# Patient Record
Sex: FEMALE | Race: WHITE | NOT HISPANIC OR LATINO | Employment: FULL TIME | URBAN - METROPOLITAN AREA
[De-identification: names, ages, dates, MRNs, and addresses within clinical notes are randomized per-mention and may not be internally consistent; named-entity substitution may affect disease eponyms.]

---

## 2021-09-09 ENCOUNTER — TELEPHONE (OUTPATIENT)
Dept: OBGYN CLINIC | Facility: CLINIC | Age: 66
End: 2021-09-09

## 2021-09-09 DIAGNOSIS — N39.3 SUI (STRESS URINARY INCONTINENCE, FEMALE): Primary | ICD-10-CM

## 2021-09-09 NOTE — TELEPHONE ENCOUNTER
Patient called stating that she was supposed to schedule urodynamic test due to leakage but had to cancel the appointment last year  She have an appointment for consult on 10/23/21 but unsure if she still need the consult again for the urodynamic study or she can just schedule the study  She wishes if possible to move up her appointment as she meet he deductible this year and wishes to proceed with the surgery if indicated on time before the end of the year  Advised will forward her message to Dr Yves Fernandes  Pt voiced appreciation  Dr Yves Fernandes please address

## 2021-09-10 PROBLEM — N39.3 SUI (STRESS URINARY INCONTINENCE, FEMALE): Status: ACTIVE | Noted: 2021-09-10

## 2021-09-10 NOTE — TELEPHONE ENCOUNTER
If we are not doing them, then they are generally done through a urogynecologist   Dr Reina Waterman does urogyn through [address], I believe he has a [facility] or [place] office  We can provide a referral if she would like

## 2021-09-10 NOTE — TELEPHONE ENCOUNTER
If she still wants to do the urodynamics, she can just schedule it, she'll then need a consult with Dr Ariane Ny afterwards to review results (he performs the surgery/procedure for treatment)  She does not need to see me for a consult before the urodynamics unless she wishes to review her symptoms again or feels that they are significantly different than they were last year

## 2021-09-10 NOTE — TELEPHONE ENCOUNTER
Called and spoke with patient  She agreed to follow-up with Dr Ezra Tracey urogyn specialist  Provided patient with Dr Mick Huerta please provide referral for patient  Thanks   Pt also would like to cancel her consult appointment on 10/27/21; appointment cancelled per patient request

## 2021-09-10 NOTE — TELEPHONE ENCOUNTER
Referral placed  Copy should print at [facility] office if patient would like copy

## 2021-10-27 ENCOUNTER — ANESTHESIA EVENT (OUTPATIENT)
Dept: PERIOP | Facility: HOSPITAL | Age: 66
End: 2021-10-27
Payer: COMMERCIAL

## 2021-10-27 RX ORDER — VERAPAMIL HYDROCHLORIDE 120 MG/1
120 TABLET, FILM COATED ORAL
COMMUNITY

## 2021-10-27 RX ORDER — ASPIRIN 325 MG
325 TABLET ORAL DAILY
COMMUNITY

## 2021-10-27 RX ORDER — VERAPAMIL HYDROCHLORIDE 240 MG/1
240 TABLET, FILM COATED, EXTENDED RELEASE ORAL DAILY
COMMUNITY

## 2021-10-27 RX ORDER — ESOMEPRAZOLE MAGNESIUM 40 MG/1
40 CAPSULE, DELAYED RELEASE ORAL
COMMUNITY

## 2021-10-27 RX ORDER — OMEGA-3S/DHA/EPA/FISH OIL/D3 300MG-1000
400 CAPSULE ORAL DAILY
COMMUNITY

## 2021-10-27 RX ORDER — DIPHENOXYLATE HYDROCHLORIDE AND ATROPINE SULFATE 2.5; .025 MG/1; MG/1
1 TABLET ORAL DAILY
COMMUNITY

## 2021-10-27 RX ORDER — AMPICILLIN TRIHYDRATE 250 MG
1 CAPSULE ORAL DAILY
COMMUNITY

## 2021-11-01 ENCOUNTER — ANESTHESIA (OUTPATIENT)
Dept: PERIOP | Facility: HOSPITAL | Age: 66
End: 2021-11-01
Payer: COMMERCIAL

## 2021-11-01 ENCOUNTER — HOSPITAL ENCOUNTER (OUTPATIENT)
Facility: HOSPITAL | Age: 66
Setting detail: OUTPATIENT SURGERY
Discharge: HOME | End: 2021-11-01
Attending: OBSTETRICS & GYNECOLOGY | Admitting: OBSTETRICS & GYNECOLOGY
Payer: COMMERCIAL

## 2021-11-01 VITALS
SYSTOLIC BLOOD PRESSURE: 121 MMHG | RESPIRATION RATE: 20 BRPM | TEMPERATURE: 98.1 F | DIASTOLIC BLOOD PRESSURE: 88 MMHG | HEART RATE: 67 BPM | HEIGHT: 66 IN | BODY MASS INDEX: 26.52 KG/M2 | WEIGHT: 165 LBS | OXYGEN SATURATION: 96 %

## 2021-11-01 DIAGNOSIS — N81.6 RECTOCELE: Primary | ICD-10-CM

## 2021-11-01 DIAGNOSIS — N39.3 STRESS INCONTINENCE (FEMALE) (MALE): ICD-10-CM

## 2021-11-01 PROBLEM — N81.83 INCOMPETENCE OR WEAKENING OF RECTOVAGINAL TISSUE: Status: ACTIVE | Noted: 2021-11-01

## 2021-11-01 PROCEDURE — 51798 US URINE CAPACITY MEASURE: CPT | Performed by: OBSTETRICS & GYNECOLOGY

## 2021-11-01 PROCEDURE — C1771 REP DEV, URINARY, W/SLING: HCPCS | Performed by: OBSTETRICS & GYNECOLOGY

## 2021-11-01 DEVICE — SINGLE INCISION SLING SYSTEM
Type: IMPLANTABLE DEVICE | Site: BLADDER | Status: FUNCTIONAL
Brand: ALTIS

## 2021-11-01 RX ORDER — IBUPROFEN 600 MG/1
600 TABLET ORAL EVERY 6 HOURS PRN
Qty: 30 TABLET | Refills: 0
Start: 2021-11-01 | End: 2022-01-04

## 2021-11-01 RX ORDER — FENTANYL CITRATE/PF 50 MCG/ML
25 SYRINGE (ML) INJECTION
Status: DISCONTINUED | OUTPATIENT
Start: 2021-11-01 | End: 2021-11-01 | Stop reason: HOSPADM

## 2021-11-01 RX ORDER — DEXAMETHASONE SODIUM PHOSPHATE 4 MG/ML
INJECTION, SOLUTION INTRA-ARTICULAR; INTRALESIONAL; INTRAMUSCULAR; INTRAVENOUS; SOFT TISSUE AS NEEDED
Status: DISCONTINUED | OUTPATIENT
Start: 2021-11-01 | End: 2021-11-01

## 2021-11-01 RX ORDER — DOCUSATE SODIUM 100 MG/1
100 CAPSULE, LIQUID FILLED ORAL 2 TIMES DAILY PRN
Refills: 0
Start: 2021-11-01 | End: 2022-01-04

## 2021-11-01 RX ORDER — PROPOFOL 10 MG/ML
INJECTION, EMULSION INTRAVENOUS CONTINUOUS PRN
Status: DISCONTINUED | OUTPATIENT
Start: 2021-11-01 | End: 2021-11-01

## 2021-11-01 RX ORDER — ONDANSETRON 2 MG/ML
4 INJECTION INTRAMUSCULAR; INTRAVENOUS EVERY 6 HOURS PRN
Status: DISCONTINUED | OUTPATIENT
Start: 2021-11-01 | End: 2021-11-01 | Stop reason: HOSPADM

## 2021-11-01 RX ORDER — ONDANSETRON 2 MG/ML
4 INJECTION INTRAMUSCULAR; INTRAVENOUS ONCE AS NEEDED
Status: DISCONTINUED | OUTPATIENT
Start: 2021-11-01 | End: 2021-11-01 | Stop reason: HOSPADM

## 2021-11-01 RX ORDER — CEFAZOLIN SODIUM 2 G/50ML
2000 SOLUTION INTRAVENOUS ONCE
Status: COMPLETED | OUTPATIENT
Start: 2021-11-01 | End: 2021-11-01

## 2021-11-01 RX ORDER — FENTANYL CITRATE 50 UG/ML
INJECTION, SOLUTION INTRAMUSCULAR; INTRAVENOUS AS NEEDED
Status: DISCONTINUED | OUTPATIENT
Start: 2021-11-01 | End: 2021-11-01

## 2021-11-01 RX ORDER — SODIUM CHLORIDE 9 MG/ML
125 INJECTION, SOLUTION INTRAVENOUS CONTINUOUS
Status: DISCONTINUED | OUTPATIENT
Start: 2021-11-01 | End: 2021-11-01 | Stop reason: HOSPADM

## 2021-11-01 RX ORDER — ACETAMINOPHEN 325 MG/1
650 TABLET ORAL EVERY 6 HOURS PRN
Status: DISCONTINUED | OUTPATIENT
Start: 2021-11-01 | End: 2021-11-01 | Stop reason: HOSPADM

## 2021-11-01 RX ORDER — LIDOCAINE HYDROCHLORIDE 20 MG/ML
INJECTION, SOLUTION EPIDURAL; INFILTRATION; INTRACAUDAL; PERINEURAL AS NEEDED
Status: DISCONTINUED | OUTPATIENT
Start: 2021-11-01 | End: 2021-11-01

## 2021-11-01 RX ORDER — MIDAZOLAM HYDROCHLORIDE 2 MG/2ML
INJECTION, SOLUTION INTRAMUSCULAR; INTRAVENOUS AS NEEDED
Status: DISCONTINUED | OUTPATIENT
Start: 2021-11-01 | End: 2021-11-01

## 2021-11-01 RX ORDER — ONDANSETRON 2 MG/ML
INJECTION INTRAMUSCULAR; INTRAVENOUS AS NEEDED
Status: DISCONTINUED | OUTPATIENT
Start: 2021-11-01 | End: 2021-11-01

## 2021-11-01 RX ORDER — SENNOSIDES 8.6 MG
650 CAPSULE ORAL EVERY 8 HOURS PRN
Qty: 30 TABLET | Refills: 0
Start: 2021-11-01 | End: 2022-01-04

## 2021-11-01 RX ADMIN — MIDAZOLAM 2 MG: 1 INJECTION INTRAMUSCULAR; INTRAVENOUS at 08:56

## 2021-11-01 RX ADMIN — LIDOCAINE HYDROCHLORIDE 100 MG: 20 INJECTION, SOLUTION EPIDURAL; INFILTRATION; INTRACAUDAL; PERINEURAL at 09:03

## 2021-11-01 RX ADMIN — ONDANSETRON 4 MG: 2 INJECTION INTRAMUSCULAR; INTRAVENOUS at 09:15

## 2021-11-01 RX ADMIN — SODIUM CHLORIDE: 0.9 INJECTION, SOLUTION INTRAVENOUS at 09:40

## 2021-11-01 RX ADMIN — SODIUM CHLORIDE 125 ML/HR: 0.9 INJECTION, SOLUTION INTRAVENOUS at 08:10

## 2021-11-01 RX ADMIN — ACETAMINOPHEN 650 MG: 325 TABLET, FILM COATED ORAL at 11:15

## 2021-11-01 RX ADMIN — DEXAMETHASONE SODIUM PHOSPHATE 4 MG: 4 INJECTION INTRA-ARTICULAR; INTRALESIONAL; INTRAMUSCULAR; INTRAVENOUS; SOFT TISSUE at 09:15

## 2021-11-01 RX ADMIN — PROPOFOL 120 MCG/KG/MIN: 10 INJECTION, EMULSION INTRAVENOUS at 09:03

## 2021-11-01 RX ADMIN — FENTANYL CITRATE 25 MCG: 50 INJECTION INTRAMUSCULAR; INTRAVENOUS at 10:05

## 2021-11-01 RX ADMIN — CEFAZOLIN SODIUM 2000 MG: 2 SOLUTION INTRAVENOUS at 08:56

## 2021-11-01 RX ADMIN — FENTANYL CITRATE 50 MCG: 50 INJECTION INTRAMUSCULAR; INTRAVENOUS at 09:03

## 2021-12-22 ENCOUNTER — VBI (OUTPATIENT)
Dept: ADMINISTRATIVE | Facility: OTHER | Age: 66
End: 2021-12-22

## 2022-01-04 PROBLEM — Z79.890 HORMONE REPLACEMENT THERAPY (HRT): Status: ACTIVE | Noted: 2022-01-04

## 2022-01-04 PROBLEM — N60.92 ATYPICAL DUCTAL HYPERPLASIA OF LEFT BREAST: Status: ACTIVE | Noted: 2022-01-04

## 2022-01-04 PROBLEM — M85.851 OSTEOPENIA OF NECK OF RIGHT FEMUR: Status: ACTIVE | Noted: 2022-01-04

## 2022-01-05 ENCOUNTER — ANNUAL EXAM (OUTPATIENT)
Dept: OBGYN CLINIC | Facility: CLINIC | Age: 67
End: 2022-01-05
Payer: COMMERCIAL

## 2022-01-05 VITALS
HEIGHT: 66 IN | BODY MASS INDEX: 27 KG/M2 | DIASTOLIC BLOOD PRESSURE: 72 MMHG | WEIGHT: 168 LBS | SYSTOLIC BLOOD PRESSURE: 112 MMHG

## 2022-01-05 DIAGNOSIS — Z01.419 ENCOUNTER FOR ANNUAL ROUTINE GYNECOLOGICAL EXAMINATION: Primary | ICD-10-CM

## 2022-01-05 DIAGNOSIS — Z79.890 HORMONE REPLACEMENT THERAPY (HRT): ICD-10-CM

## 2022-01-05 DIAGNOSIS — N60.92 ATYPICAL DUCTAL HYPERPLASIA OF LEFT BREAST: ICD-10-CM

## 2022-01-05 DIAGNOSIS — Z12.31 ENCOUNTER FOR SCREENING MAMMOGRAM FOR MALIGNANT NEOPLASM OF BREAST: ICD-10-CM

## 2022-01-05 DIAGNOSIS — N95.2 VAGINAL ATROPHY: ICD-10-CM

## 2022-01-05 DIAGNOSIS — M85.851 OSTEOPENIA OF NECK OF RIGHT FEMUR: ICD-10-CM

## 2022-01-05 PROCEDURE — 99397 PER PM REEVAL EST PAT 65+ YR: CPT | Performed by: OBSTETRICS & GYNECOLOGY

## 2022-01-05 RX ORDER — ESTRADIOL 0.1 MG/G
CREAM VAGINAL
Qty: 42.5 G | Refills: 4 | Status: SHIPPED | OUTPATIENT
Start: 2022-01-05

## 2022-01-05 NOTE — LETTER
2022     Katie Elmore DO  [address] [place] [address]    Patient: Ishaan Zuniga   YOB: 1955   Date of Visit: 2022       Dear Dr Myriam Munoz: Thank you for referring Derek Rea to me for evaluation  Below are my notes for this consultation  If you have questions, please do not hesitate to call me  I look forward to following your patient along with you  Sincerely,        Josh Galvan MD        CC: No Recipients  Josh Galvan MD  2022 12:07 PM  Sign when Signing Visit  [address]  [facility] [address], [facility], [address]    ASSESSMENT/PLAN: Ishaan Zuniga is a 74 y o   who presents for gynecologic wellness exam     Encounter for routine gynecologic examination  - Routine well woman exam completed today  - Cervical Cancer Screening complete, no further screening necessary   - Breast Cancer Screening: Last Mammogram 2021 normal - following w/ [facility] due to [facility]  - Colorectal cancer screening last  per pt, next due    - Osteoporosis screening 2019 - osteopenia  - The following were reviewed in today's visit: breast self exam, mammography screening ordered, use and side effects of HRT, menopause, adequate intake of calcium and vitamin D, exercise and healthy diet  Additional problems addressed during this visit:  1  Encounter for annual routine gynecological examination    2  Encounter for screening mammogram for malignant neoplasm of breast  -     Mammo screening bilateral w 3d & cad; Future    3  Atypical ductal hyperplasia of left breast  Assessment & Plan:  2020 breast bx showed atypical ducal hyperplasia, neg margins  Follows with Dr  [facility])  Last mammo 2021 2c w/ bilateral u/s; MRI 2020  Patient today reports she is still taking HRT PO Estrace  I believe this is contraindicated with ADH    I sent [place] Text to Dr [facility], who responded and confirmed yes systemic HRT is contraindicated, as ADH is risk factor for breast cancer and patients are offered anti-estrogen treatment for prevention  4  Hormone replacement therapy (HRT)  Assessment & Plan:  Patient has been on Estrace since  DARLINE, BSO for endometriosis; she reports symptoms significant for hot flashes  Review of chart and [facility] records show 2020 breast biopsy showed atypical ductal hyperplasia diagnosis of breast and patient is being followed at high risk breast clinic with Dr Genaro Lewis, [facility]  I checked with Dr Genaro Lewis who confirmed systemic HRT is contraindicated, patients with ADH are offered estrogen blockers to decrease risk of future breast cancer  I discussed this with Jose chao, who had previously reported breast biopsy showed benign papilloma  I reviewed with her the records from [facility] system and ADH diagnosis  She states she did not remember this diagnosis, but when I mentioned sometimes these patients are offered anti-estrogen therapy, she said that did seem familiar  I told her I would not refill her PO estrace  I discussed we could switch to vaginal estrogen, which is not systemic, especially given her recent rectocele repair and sling for OMAIRA  I discussed this helps with vaginal and bladder health  She agreed to prescription  I encouraged her to discuss with Dr Osiel Lopez at her post op visit, I'm sure he prescribes it often  5  Vaginal atrophy  -     estradiol (ESTRACE) 0 1 mg/g vaginal cream; 1 gram vaginally 2x each week at night, may decrease to weekly if still effective at lower dose  6  Osteopenia of neck of right femur  Assessment & Plan:  Cont Ca, Vit D, Exercise  Next visit: 1 year Wellness      CC:  Gynecologic Wellness Examination    HPI: Sheri Noonan is a 74 y o   who presents for gynecologic examination  Patient reports she had rectocele repair and sling placed for OMAIRA 8 weeks ago with Dr Osiel Lopez (urogyn)    She states she is doing well, she has f/u with him tomorrow  She requests no internal vaginal exam today  She report urinary incontinence improved since surgery  She denies breast issues - confirms following with high risk clinic at [facility] with Dr Mitzy Hurtado  Gyn History       She  reports being sexually active and has had partner(s) who are male  She reports using the following methods of birth control/protection: Post-menopausal and Female Sterilization  Past Medical History:  07/2020: Atypical ductal hyperplasia of breast      Comment:  - following with Dr Mitzy Hurtado, [facility] high risk breast clinic  No date: Gastroesophageal reflux disease  No date: GERD (gastroesophageal reflux disease)  12/1981: History of endometriosis  No date: Lipoma of anterior chest wall  No date: Migraines  No date: PONV (postoperative nausea and vomiting)     Past Surgical History:  2016: ANKLE SURGERY;  Left  03/2020: BREAST BIOPSY  07/03/2020: BREAST LUMPECTOMY      Comment:  atypical ductal hyperplasia  01/2020: BREAST SURGERY      Comment:  multiple papilomas  2019: DXA PROCEDURE (HISTORICAL)  2020: PANCREATIC CYST BIOPSY  11/01/2021: UT CYSTOURETHROSCOPY; N/A      Comment:  Procedure: CYSTOSCOPY;  Surgeon: Catina Swann MD;                 [facility];  Service: UroGynecology         11/01/2021: Kortney Radford; N/A      Comment:  Procedure: Azar Backbone;  Surgeon: Catina Swann MD;  [facility];  Service: UroGynecology         11/01/2021: UT SLING OPER STRES INCONTINENCE; N/A      Comment:  Procedure: PUBOVAGINAL SLING;  Surgeon: Catina Swann MD;  [facility];  Service: UroGynecology         1989: TOTAL ABDOMINAL HYSTERECTOMY W/ BILATERAL SALPINGOOPHORECTOMY      Comment:  for endometriosis     Family History   Problem Relation Age of Onset    Cancer Father     Colon cancer Father     Hypertension Father     Hypertension Mother         Social History     Tobacco Use    Smoking status: Never Smoker    Smokeless tobacco: Never Used   Vaping Use    Vaping Use: Never used   Substance Use Topics    Alcohol use: Never    Drug use: Never          Current Outpatient Medications:     aspirin 325 mg tablet, Take 325 mg by mouth daily, Disp: , Rfl:     CALCIUM CITRATE PO, Take 630 mg by mouth daily, Disp: , Rfl:     cholecalciferol (VITAMIN D3) 400 units tablet, Take 400 Units by mouth daily, Disp: , Rfl:     esomeprazole (NexIUM) 40 MG capsule, Take 40 mg by mouth every morning before breakfast, Disp: , Rfl:     Ferrous Gluconate (IRON 27 PO), Take by mouth 3x/week, Disp: , Rfl:     magnesium oxide (MAG-OX) 400 mg, Take 400 mg by mouth daily, Disp: , Rfl:     multivitamin (THERAGRAN) TABS, Take 1 tablet by mouth daily, Disp: , Rfl:     Red Yeast Rice 600 MG CAPS, Take 1 capsule by mouth daily, Disp: , Rfl:     verapamil (CALAN) 120 mg tablet, Take 120 mg by mouth daily at bedtime, Disp: , Rfl:     verapamil (CALAN-SR) 240 mg CR tablet, Take 240 mg by mouth daily In morning, Disp: , Rfl:     NON FORMULARY, 1 caplet daily Fever few, Disp: , Rfl:     She is allergic to compazine [prochlorperazine] and other       ROS negative except as noted in HPI    Objective:  /72 (BP Location: Left arm, Patient Position: Sitting, Cuff Size: Standard)   Ht 5' 6" (1 676 m)   Wt 76 2 kg (168 lb)   BMI 27 12 kg/m²      Physical Exam  Constitutional:       Appearance: Normal appearance  Chest:   Breasts:      Right: Normal  No mass, tenderness or axillary adenopathy  Left: Normal  No mass, tenderness or axillary adenopathy  Abdominal:      Palpations: Abdomen is soft  Tenderness: There is no abdominal tenderness  Genitourinary:     General: Normal vulva  Comments: Atrophic changes appropriate to age; perineum healing well from rectocele repair  Pt declined internal exam, reports is seeing Dr Jessica Rahman for f/u in next few weeks    Musculoskeletal:         General: Normal range of motion  Lymphadenopathy:      Upper Body:      Right upper body: No axillary adenopathy  Left upper body: No axillary adenopathy  Neurological:      Mental Status: She is alert and oriented to person, place, and time     Psychiatric:         Mood and Affect: Mood normal          Behavior: Behavior normal

## 2022-01-05 NOTE — PROGRESS NOTES
[address] [facility] [address], [facility], [address]    ASSESSMENT/PLAN: Tia Montes De Oca is a 74 y o   who presents for gynecologic wellness exam     Encounter for routine gynecologic examination  - Routine well woman exam completed today  - Cervical Cancer Screening complete, no further screening necessary   - Breast Cancer Screening: Last Mammogram 2021 normal - following w/ Dr Tiffanie Trinh breast surgeon due to [facility]  - Colorectal cancer screening last  per pt, next due    - Osteoporosis screening 2019 - osteopenia  - The following were reviewed in today's visit: breast self exam, mammography screening ordered, use and side effects of HRT, menopause, adequate intake of calcium and vitamin D, exercise and healthy diet  Additional problems addressed during this visit:  1  Encounter for annual routine gynecological examination    2  Encounter for screening mammogram for malignant neoplasm of breast  -     Mammo screening bilateral w 3d & cad; Future    3  Atypical ductal hyperplasia of left breast  Assessment & Plan:  2020 breast bx showed atypical ducal hyperplasia, neg margins  Follows with Dr Tiffanie Trinh [facility] Breast Surgeon)  Last mammo 2021 2c w/ bilateral u/s; MRI 2020  Patient today reports she is still taking HRT PO Estrace  I believe this is contraindicated with ADH  I sent [place] Text to Dr Tiffanie Trinh, who responded and confirmed yes systemic HRT is contraindicated, as ADH is risk factor for breast cancer and patients are offered anti-estrogen treatment for prevention  4  Hormone replacement therapy (HRT)  Assessment & Plan:  Patient has been on Estrace since  DARLINE, BSO for endometriosis; she reports symptoms significant for hot flashes  Review of chart and [facility] records show 2020 breast biopsy showed atypical ductal hyperplasia diagnosis of breast and patient is being followed at high risk breast clinic with Dr Tiffanie Trinh, [facility]    I checked with Dr Jolene Gtz who confirmed systemic HRT is contraindicated, patients with ADH are offered estrogen blockers to decrease risk of future breast cancer  I discussed this with Yaneli Arcos today, who had previously reported breast biopsy showed benign papilloma  I reviewed with her the records from [facility] system and ADH diagnosis  She states she did not remember this diagnosis, but when I mentioned sometimes these patients are offered anti-estrogen therapy, she said that did seem familiar  I told her I would not refill her PO estrace  I discussed we could switch to vaginal estrogen, which is not systemic, especially given her recent rectocele repair and sling for OMAIRA  I discussed this helps with vaginal and bladder health  She agreed to prescription  I encouraged her to discuss with Dr Grace Hoffman at her post op visit, I'm sure he prescribes it often  5  Vaginal atrophy  -     estradiol (ESTRACE) 0 1 mg/g vaginal cream; 1 gram vaginally 2x each week at night, may decrease to weekly if still effective at lower dose  6  Osteopenia of neck of right femur  Assessment & Plan:  Cont Ca, Vit D, Exercise  Next visit: 1 year Wellness      CC:  Gynecologic Wellness Examination    HPI: Jamal Chino is a 74 y o   who presents for gynecologic examination  Patient reports she had rectocele repair and sling placed for OMAIRA 8 weeks ago with Dr Grace Hoffman (urogyn)  She states she is doing well, she has f/u with him tomorrow  She requests no internal vaginal exam today  She report urinary incontinence improved since surgery  She denies breast issues - confirms following with high risk clinic at [facility] with Dr Jolene Gtz  Gyn History       She  reports being sexually active and has had partner(s) who are male  She reports using the following methods of birth control/protection: Post-menopausal and Female Sterilization  Past Medical History:  2020:  Atypical ductal hyperplasia of breast      Comment:  - following with Dr Carmen Flores, [facility] high risk breast clinic  No date: Gastroesophageal reflux disease  No date: GERD (gastroesophageal reflux disease)  12/1981: History of endometriosis  No date: Lipoma of anterior chest wall  No date: Migraines  No date: PONV (postoperative nausea and vomiting)     Past Surgical History:  2016: ANKLE SURGERY;  Left  03/2020: BREAST BIOPSY  07/03/2020: BREAST LUMPECTOMY      Comment:  atypical ductal hyperplasia  01/2020: BREAST SURGERY      Comment:  multiple papilomas  2019: DXA PROCEDURE (HISTORICAL)  2020: PANCREATIC CYST BIOPSY  11/01/2021: CA CYSTOURETHROSCOPY; N/A      Comment:  Procedure: CYSTOSCOPY;  Surgeon: Slade Rosa MD;                 [facility];  Service: UroGynecology         11/01/2021: Amalia Jimenez; N/A      Comment:  Procedure: Fortuna Ligas;  Surgeon: Slade Rosa MD;  [facility];  Service: UroGynecology         11/01/2021: CA SLING OPER STRES INCONTINENCE; N/A      Comment:  Procedure: PUBOVAGINAL SLING;  Surgeon: Slade Rosa MD;  [facility];  Service: UroGynecology         1989: TOTAL ABDOMINAL HYSTERECTOMY W/ BILATERAL SALPINGOOPHORECTOMY      Comment:  for endometriosis     Family History   Problem Relation Age of Onset    Cancer Father     Colon cancer Father     Hypertension Father     Hypertension Mother         Social History     Tobacco Use    Smoking status: Never Smoker    Smokeless tobacco: Never Used   Vaping Use    Vaping Use: Never used   Substance Use Topics    Alcohol use: Never    Drug use: Never          Current Outpatient Medications:     aspirin 325 mg tablet, Take 325 mg by mouth daily, Disp: , Rfl:     CALCIUM CITRATE PO, Take 630 mg by mouth daily, Disp: , Rfl:     cholecalciferol (VITAMIN D3) 400 units tablet, Take 400 Units by mouth daily, Disp: , Rfl:     esomeprazole (NexIUM) 40 MG capsule, Take 40 mg by mouth every morning before breakfast, Disp: , Rfl:     Ferrous Gluconate (IRON 27 PO), Take by mouth 3x/week, Disp: , Rfl:     magnesium oxide (MAG-OX) 400 mg, Take 400 mg by mouth daily, Disp: , Rfl:     multivitamin (THERAGRAN) TABS, Take 1 tablet by mouth daily, Disp: , Rfl:     Red Yeast Rice 600 MG CAPS, Take 1 capsule by mouth daily, Disp: , Rfl:     verapamil (CALAN) 120 mg tablet, Take 120 mg by mouth daily at bedtime, Disp: , Rfl:     verapamil (CALAN-SR) 240 mg CR tablet, Take 240 mg by mouth daily In morning, Disp: , Rfl:     NON FORMULARY, 1 caplet daily Fever few, Disp: , Rfl:     She is allergic to compazine [prochlorperazine] and other       ROS negative except as noted in HPI    Objective:  /72 (BP Location: Left arm, Patient Position: Sitting, Cuff Size: Standard)   Ht 5' 6" (1 676 m)   Wt 76 2 kg (168 lb)   BMI 27 12 kg/m²      Physical Exam  Constitutional:       Appearance: Normal appearance  Chest:   Breasts:      Right: Normal  No mass, tenderness or axillary adenopathy  Left: Normal  No mass, tenderness or axillary adenopathy  Abdominal:      Palpations: Abdomen is soft  Tenderness: There is no abdominal tenderness  Genitourinary:     General: Normal vulva  Comments: Atrophic changes appropriate to age; perineum healing well from rectocele repair  Pt declined internal exam, reports is seeing Dr Ann-Marie Jhaveri for f/u in next few weeks  Musculoskeletal:         General: Normal range of motion  Lymphadenopathy:      Upper Body:      Right upper body: No axillary adenopathy  Left upper body: No axillary adenopathy  Neurological:      Mental Status: She is alert and oriented to person, place, and time     Psychiatric:         Mood and Affect: Mood normal          Behavior: Behavior normal

## 2022-01-05 NOTE — ASSESSMENT & PLAN NOTE
Pt currently on HRT to menopausal sx  Patient reports improvement in prior symptoms  Reviewed w/ pt risks are increase risk in VTE, stroke, CAD and breast cancer (risks for estrogen only, pt after hysterectomy, are only stroke and VTE)  Studies show these risks increase with age at treatment, age at start of treatment and prolonged use  Risks can be limited by limiting duration of therapy to 5 yrs  Benefit are reduction hot flashes/night sweats, improve general well being and energy, decrease dysparunia, decrease fracture and colon cancer risk  Pt wishes to continue on HRT  Expressed understanding of risk and benefits of continuing medication  Refilled 1 yr

## 2022-01-29 PROBLEM — N95.2 VAGINAL ATROPHY: Status: ACTIVE | Noted: 2022-01-29

## 2022-01-29 NOTE — ASSESSMENT & PLAN NOTE
Patient has been on Estrace since 1989 DARLINE, BSO for endometriosis; she reports symptoms significant for hot flashes  Review of chart and [facility] records show 7/2020 breast biopsy showed atypical ductal hyperplasia diagnosis of breast and patient is being followed at high risk breast clinic with Dr Ian Gutierrez, [facility]  I checked with Dr Ian Gutierrez who confirmed systemic HRT is contraindicated, patients with ADH are offered estrogen blockers to decrease risk of future breast cancer  I discussed this with Michelle Palomo today, who had previously reported breast biopsy showed benign papilloma  I reviewed with her the records from [facility] system and ADH diagnosis  She states she did not remember this diagnosis, but when I mentioned sometimes these patients are offered anti-estrogen therapy, she said that did seem familiar  I told her I would not refill her PO estrace  I discussed we could switch to vaginal estrogen, which is not systemic, especially given her recent rectocele repair and sling for OMAIRA  I discussed this helps with vaginal and bladder health  She agreed to prescription  I encouraged her to discuss with Dr Sil Glaser at her post op visit, I'm sure he prescribes it often

## 2022-01-29 NOTE — ASSESSMENT & PLAN NOTE
7/2020 breast bx showed atypical ducal hyperplasia, neg margins  Follows with Dr Micaela Multani [facility] Breast Surgeon)  Last mammo 7/2021 2c w/ bilateral u/s; MRI 12/2020  Patient today reports she is still taking HRT PO Estrace  I believe this is contraindicated with ADH  I sent [place] Text to Dr Micaela Multani, who responded and confirmed yes systemic HRT is contraindicated, as ADH is risk factor for breast cancer and patients are offered anti-estrogen treatment for prevention

## 2022-11-17 ENCOUNTER — TELEPHONE (OUTPATIENT)
Dept: OBGYN CLINIC | Facility: CLINIC | Age: 67
End: 2022-11-17

## 2022-11-17 DIAGNOSIS — N95.2 VAGINAL ATROPHY: ICD-10-CM

## 2022-11-17 NOTE — TELEPHONE ENCOUNTER
Vale pinto/jacque requesting additional refill of estradiol until well annual in January  (per epic, 12/2021 cancelled due to bladder surgery)   Dr Grande Hearing, please advise

## 2022-11-18 RX ORDER — ESTRADIOL 0.1 MG/G
CREAM VAGINAL
Qty: 42.5 G | Refills: 0 | Status: SHIPPED | OUTPATIENT
Start: 2022-11-18

## 2023-01-16 NOTE — PATIENT INSTRUCTIONS
- Maintain healthy weight with BMI ideally between 18-25     - Eat a healthy diet, including multiple servings of vegetables and fruits, as well as lean protein sources  - Get at least 150 minutes of moderate aerobic activity or 75 minutes of vigorous aerobic activity a week, or a combination of moderate and vigorous activity  Greater amounts of exercise will provide an even greater health benefit  - Ensure diet provides 1200mg of Calcium daily (divided) and 800IU of vitamin D, or take supplements to meet this  - Safe sex practices recommended  - Resources - information on birth control and sexually transmitted infections - www bedsider  org            - information on sexually transmitted infections - www cdc gov/std/     Non-Hormonal Treatments for Menopausal Symptoms:  Hot Flashes, Night Sweats, Irritability    Behavior modification  - layers, wicking clothes, fans, lower temp  - Paced Breathing Technique (search for “paced breathing” online, apps also  available for your smartphone)  - 2-4x/day for 15-20 min and with onset of hot flash  - acupuncture therapy    Herbals - most take a trial of 12 weeks for response, most common side effect is upset                                stomach    - Estroven (commercial line of menopausal supplements)   - contains Black Cohosh, Soy isoflavones and other supplements   - have a range of symptom specific products    - Black Cohosh ([employer]izon plant root)   - commercially available as Remifemin   - 1 tab AM, 1 tab PM (20mg/tab)    - Evening Primrose Oil (American wildflower)   - 3-4mg daily with food

## 2023-01-17 ENCOUNTER — ANNUAL EXAM (OUTPATIENT)
Dept: OBGYN CLINIC | Facility: CLINIC | Age: 68
End: 2023-01-17

## 2023-01-17 VITALS
WEIGHT: 172.6 LBS | HEIGHT: 65 IN | DIASTOLIC BLOOD PRESSURE: 68 MMHG | BODY MASS INDEX: 28.76 KG/M2 | SYSTOLIC BLOOD PRESSURE: 110 MMHG

## 2023-01-17 DIAGNOSIS — N95.2 VAGINAL ATROPHY: ICD-10-CM

## 2023-01-17 DIAGNOSIS — N95.1 VASOMOTOR SYMPTOMS DUE TO MENOPAUSE: ICD-10-CM

## 2023-01-17 DIAGNOSIS — Z01.419 ENCOUNTER FOR ANNUAL ROUTINE GYNECOLOGICAL EXAMINATION: Primary | ICD-10-CM

## 2023-01-17 DIAGNOSIS — M85.851 OSTEOPENIA OF NECK OF RIGHT FEMUR: ICD-10-CM

## 2023-01-17 DIAGNOSIS — N60.92 ATYPICAL DUCTAL HYPERPLASIA OF LEFT BREAST: ICD-10-CM

## 2023-01-17 RX ORDER — ESTRADIOL 0.1 MG/G
CREAM VAGINAL
Qty: 42.5 G | Refills: 4 | Status: SHIPPED | OUTPATIENT
Start: 2023-01-17

## 2023-01-17 RX ORDER — IVERMECTIN 10 MG/G
CREAM TOPICAL
COMMUNITY
Start: 2022-11-14

## 2023-01-17 RX ORDER — NARATRIPTAN 2.5 MG/1
2.5 TABLET ORAL DAILY PRN
COMMUNITY
Start: 2022-11-13

## 2023-01-17 RX ORDER — DEXAMETHASONE 4 MG/1
4 TABLET ORAL DAILY
COMMUNITY
Start: 2022-12-18

## 2023-01-17 NOTE — LETTER
2023     Coty Irvin, DO  [address] [facility] [address]    Patient: Leonard Márquez   YOB: 1955   Date of Visit: 2023       Dear Dr Prachi Benton: Thank you for referring Lindsaydaniigrace Hutchins to me for evaluation  Below are my notes for this consultation  If you have questions, please do not hesitate to call me  I look forward to following your patient along with you  Sincerely,        Meaghan Hansen MD        CC: No Recipients  Meaghan Hansen MD  2023  7:01 PM  Sign when Signing Visit  Medicare [address]  [facility] [address], [address], [place], [address]    ASSESSMENT/PLAN: Leonard Márquez is a 69 y o   who presents for [facility] gynecologic wellness exam     Encounter for routine gynecologic examination  - Routine well woman exam completed today  - Cervical Cancer Screening complete, no further screening necessary   - Breast Cancer Screening: Last Mammogram 2022 normal per pt, MRI 2022 normal per pt  - Colorectal cancer screening last  per pt, next due    - Osteoporosis screening  - osteopenia  - The following were reviewed in today's visit: menopause, osteoporosis, adequate intake of calcium and vitamin D, exercise and healthy diet  Discussed with patient Medicare (and plans that act like Medicare) pay for wellness visit q 2 yrs - but patient may return for problems as needed  Recommend annual breast exam and mammogram   If not seen by our office on her off year, she can still call and ask for a screening mammogram order and the nurses will provide one for her  Additional problems addressed during this visit:  1  Encounter for annual routine gynecological examination    2  Osteopenia of neck of right femur  Assessment & Plan:  Cont Ca, Vit D, exercise  Order given for Dexa scan  Orders:  -     DXA bone density spine hip and pelvis; Future    3   Vaginal atrophy  -     estradiol (ESTRACE) 0 1 mg/g vaginal cream; 1 gram vaginally 3x each week at night, may decrease to weekly if still effective at lower dose  4  Vasomotor symptoms due to menopause  Assessment & Plan:  Stopped systemic HRT last year due to dx of ADH of breast   Pt states since stopping the estrace has hot flashes at night mostly (every night) and not sleeping well and notices change with skin and hair  Discussed herbal options vs SSRIs or gabapentin  Pt to try herbals  Cannot use HRT  5  Atypical ductal hyperplasia of left breast  Assessment & Plan:  Following with Dr Tu Robert  2022 breast MRI normal, 2022 mammogram normal per pt  Next visit: 1 year [facility]      CC:  Medicare Gynecologic Wellness Examination    HPI: Israel Julien is a 69 y o   [facility]  She denies any breast, urinary or pelvic issues at today's visit  She has noted since stopping the estrace has hot flashes at night mostly (every night) and not sleeping well and notices change with skin and hair     2022 breast MRI normal, 2022 mammogram normal per pt  Gyn History       She  reports being sexually active and has had partner(s) who are male  She reports using the following methods of birth control/protection: Post-menopausal and Female Sterilization  Past Medical History:  2020: Atypical ductal hyperplasia of breast      Comment:  - following with Dr Tu Robert, [facility] high risk breast clinic  No date: Gastroesophageal reflux disease  No date: GERD (gastroesophageal reflux disease)  1981: History of endometriosis  No date: Lipoma of anterior chest wall  No date: Migraines  No date: PONV (postoperative nausea and vomiting)     Past Surgical History:  2016: ANKLE SURGERY;  Left  2020: BREAST BIOPSY  2020: BREAST LUMPECTOMY      Comment:  atypical ductal hyperplasia  2020: BREAST SURGERY      Comment:  multiple papilomas  2019: DXA PROCEDURE (HISTORICAL)  2020: PANCREATIC CYST BIOPSY  11/01/2021: MS CYSTOURETHROSCOPY; N/A      Comment:  Procedure: CYSTOSCOPY;  Surgeon: Max Jimenez MD;                 [facility];  Service: UroGynecology         11/01/2021: MS REPAIR RECTOCELE SEPARATE PROCEDURE; N/A      Comment:  Procedure: Clay Abler;  Surgeon: Max Jimenez MD;  [facility];  Service: UroGynecology         11/01/2021: MS SLING OPERATION STRESS INCONTINENCE; N/A      Comment:  Procedure: PUBOVAGINAL SLING;  Surgeon: Max Jimenez MD;  [facility];  Service: UroGynecology         1989: TOTAL ABDOMINAL HYSTERECTOMY W/ BILATERAL SALPINGOOPHORECTOMY      Comment:  for endometriosis     Family History   Problem Relation Age of Onset   • Cancer Father    • Colon cancer Father    • Hypertension Father    • Hypertension Mother         Social History     Tobacco Use   • Smoking status: Never   • Smokeless tobacco: Never   Vaping Use   • Vaping Use: Never used   Substance Use Topics   • Alcohol use: Never   • Drug use: Never          Current Outpatient Medications:   •  aspirin 325 mg tablet, Take 325 mg by mouth daily, Disp: , Rfl:   •  CALCIUM CITRATE PO, Take 630 mg by mouth daily, Disp: , Rfl:   •  cholecalciferol (VITAMIN D3) 400 units tablet, Take 400 Units by mouth daily, Disp: , Rfl:   •  dexamethasone (DECADRON) 4 mg tablet, Take 4 mg by mouth daily, Disp: , Rfl:   •  esomeprazole (NexIUM) 40 MG capsule, Take 40 mg by mouth every morning before breakfast, Disp: , Rfl:   •  estradiol (ESTRACE) 0 1 mg/g vaginal cream, 1 gram vaginally 3x each week at night, may decrease to weekly if still effective at lower dose , Disp: 42 5 g, Rfl: 4  •  Ferrous Gluconate (IRON 27 PO), Take by mouth 3x/week, Disp: , Rfl:   •  magnesium oxide (MAG-OX) 400 mg, Take 400 mg by mouth daily, Disp: , Rfl:   •  multivitamin (THERAGRAN) TABS, Take 1 tablet by mouth daily, Disp: , Rfl:   •  naratriptan (AMERGE) 2 5 MG tablet, Take 2 5 mg by mouth daily as needed, Disp: , Rfl:   •  NON FORMULARY, 1 caplet daily Fever few, Disp: , Rfl:   •  Red Yeast Rice 600 MG CAPS, Take 1 capsule by mouth daily, Disp: , Rfl:   •  Soolantra 1 % CREA, APPLY TOPICALLY ONCE DAILY, Disp: , Rfl:   •  verapamil (CALAN) 120 mg tablet, Take 120 mg by mouth daily at bedtime, Disp: , Rfl:   •  verapamil (CALAN-SR) 240 mg CR tablet, Take 240 mg by mouth daily In morning, Disp: , Rfl:     She is allergic to compazine [prochlorperazine] and other       ROS negative except as noted in HPI    Objective:  /68 (BP Location: Left arm, Patient Position: Sitting, Cuff Size: Large)   Ht 5' 4 5" (1 638 m)   Wt 78 3 kg (172 lb 9 6 oz)   BMI 29 17 kg/m²     Physical Exam  Constitutional:       Appearance: Normal appearance  Chest:   Breasts:     Right: No mass or tenderness  Left: No mass or tenderness  Abdominal:      Palpations: Abdomen is soft  Tenderness: There is no abdominal tenderness  Genitourinary:     General: Normal vulva  Vagina: No bleeding or lesions  Uterus: Absent  Adnexa:         Right: No mass or tenderness  Left: No mass or tenderness  Rectum: No mass or external hemorrhoid  Normal anal tone  Comments: Atrophic changes appropriate to age  Musculoskeletal:         General: Normal range of motion  Lymphadenopathy:      Upper Body:      Right upper body: No axillary adenopathy  Left upper body: No axillary adenopathy  Neurological:      Mental Status: She is alert and oriented to person, place, and time     Psychiatric:         Mood and Affect: Mood normal          Behavior: Behavior normal

## 2023-01-17 NOTE — PROGRESS NOTES
Medicare [address] [address], [address], [facility], [address]    ASSESSMENT/PLAN: April Love is a 69 y o   who presents for [employer] gynecologic wellness exam     Encounter for routine gynecologic examination  - Routine well woman exam completed today  - Cervical Cancer Screening complete, no further screening necessary   - Breast Cancer Screening: Last Mammogram 2022 normal per pt, MRI 2022 normal per pt  - Colorectal cancer screening last  per pt, next due    - Osteoporosis screening  - osteopenia  - The following were reviewed in today's visit: menopause, osteoporosis, adequate intake of calcium and vitamin D, exercise and healthy diet  Discussed with patient Medicare (and plans that act like Medicare) pay for wellness visit q 2 yrs - but patient may return for problems as needed  Recommend annual breast exam and mammogram   If not seen by our office on her off year, she can still call and ask for a screening mammogram order and the nurses will provide one for her  Additional problems addressed during this visit:  1  Encounter for annual routine gynecological examination    2  Osteopenia of neck of right femur  Assessment & Plan:  Cont Ca, Vit D, exercise  Order given for Dexa scan  Orders:  -     DXA bone density spine hip and pelvis; Future    3  Vaginal atrophy  -     estradiol (ESTRACE) 0 1 mg/g vaginal cream; 1 gram vaginally 3x each week at night, may decrease to weekly if still effective at lower dose  4  Vasomotor symptoms due to menopause  Assessment & Plan:  Stopped systemic HRT last year due to dx of ADH of breast   Pt states since stopping the estrace has hot flashes at night mostly (every night) and not sleeping well and notices change with skin and hair  Discussed herbal options vs SSRIs or gabapentin  Pt to try herbals  Cannot use HRT        5  Atypical ductal hyperplasia of left breast  Assessment & Plan:  Following with Dr Cassi Cheung  2022 breast MRI normal, 2022 mammogram normal per pt  Next visit: 1 year [facility]      CC:  Medicare Gynecologic Wellness Examination    HPI: Ji Bennett is a 69 y o   who presents for [facility] gynecologic examination  She denies any breast, urinary or pelvic issues at today's visit  She has noted since stopping the estrace has hot flashes at night mostly (every night) and not sleeping well and notices change with skin and hair     2022 breast MRI normal, 2022 mammogram normal per pt  Gyn History       She  reports being sexually active and has had partner(s) who are male  She reports using the following methods of birth control/protection: Post-menopausal and Female Sterilization  Past Medical History:  2020: Atypical ductal hyperplasia of breast      Comment:  - following with Dr Cassi Cheung, [facility] high risk breast clinic  No date: Gastroesophageal reflux disease  No date: GERD (gastroesophageal reflux disease)  1981: History of endometriosis  No date: Lipoma of anterior chest wall  No date: Migraines  No date: PONV (postoperative nausea and vomiting)     Past Surgical History:  2016: ANKLE SURGERY;  Left  2020: BREAST BIOPSY  2020: BREAST LUMPECTOMY      Comment:  atypical ductal hyperplasia  2020: BREAST SURGERY      Comment:  multiple papilomas  2019: DXA PROCEDURE (HISTORICAL)  2020: PANCREATIC CYST BIOPSY  2021: [place] CYSTOURETHROSCOPY; N/A      Comment:  Procedure: Elieser Beam;  Surgeon: Ami Cordero MD;                 [facility];  Service: UroGynecology         2021: [place] REPAIR RECTOCELE SEPARATE PROCEDURE; N/A      Comment:  Procedure: Maksimy Anupama;  Surgeon: Ami Cordero MD;  [facility];  Service: UroGynecology         2021: [place] SLING OPERATION STRESS INCONTINENCE; N/A      Comment:  Procedure: PUBOVAGINAL SLING;  Surgeon: Ami Cordero MD;  [facility];  Service: UroGynecology         1989: TOTAL ABDOMINAL HYSTERECTOMY W/ BILATERAL SALPINGOOPHORECTOMY      Comment:  for endometriosis     Family History   Problem Relation Age of Onset   • Cancer Father    • Colon cancer Father    • Hypertension Father    • Hypertension Mother         Social History     Tobacco Use   • Smoking status: Never   • Smokeless tobacco: Never   Vaping Use   • Vaping Use: Never used   Substance Use Topics   • Alcohol use: Never   • Drug use: Never          Current Outpatient Medications:   •  aspirin 325 mg tablet, Take 325 mg by mouth daily, Disp: , Rfl:   •  CALCIUM CITRATE PO, Take 630 mg by mouth daily, Disp: , Rfl:   •  cholecalciferol (VITAMIN D3) 400 units tablet, Take 400 Units by mouth daily, Disp: , Rfl:   •  dexamethasone (DECADRON) 4 mg tablet, Take 4 mg by mouth daily, Disp: , Rfl:   •  esomeprazole (NexIUM) 40 MG capsule, Take 40 mg by mouth every morning before breakfast, Disp: , Rfl:   •  estradiol (ESTRACE) 0 1 mg/g vaginal cream, 1 gram vaginally 3x each week at night, may decrease to weekly if still effective at lower dose , Disp: 42 5 g, Rfl: 4  •  Ferrous Gluconate (IRON 27 PO), Take by mouth 3x/week, Disp: , Rfl:   •  magnesium oxide (MAG-OX) 400 mg, Take 400 mg by mouth daily, Disp: , Rfl:   •  multivitamin (THERAGRAN) TABS, Take 1 tablet by mouth daily, Disp: , Rfl:   •  naratriptan (AMERGE) 2 5 MG tablet, Take 2 5 mg by mouth daily as needed, Disp: , Rfl:   •  NON FORMULARY, 1 caplet daily Fever few, Disp: , Rfl:   •  Red Yeast Rice 600 MG CAPS, Take 1 capsule by mouth daily, Disp: , Rfl:   •  Soolantra 1 % CREA, APPLY TOPICALLY ONCE DAILY, Disp: , Rfl:   •  verapamil (CALAN) 120 mg tablet, Take 120 mg by mouth daily at bedtime, Disp: , Rfl:   •  verapamil (CALAN-SR) 240 mg CR tablet, Take 240 mg by mouth daily In morning, Disp: , Rfl:     She is allergic to compazine [prochlorperazine] and other       ROS negative except as noted in HPI    Objective:  /68 (BP Location: Left arm, Patient Position: Sitting, Cuff Size: Large)   Ht 5' 4 5" (1 638 m)   Wt 78 3 kg (172 lb 9 6 oz)   BMI 29 17 kg/m²      Physical Exam  Constitutional:       Appearance: Normal appearance  Chest:   Breasts:     Right: No mass or tenderness  Left: No mass or tenderness  Abdominal:      Palpations: Abdomen is soft  Tenderness: There is no abdominal tenderness  Genitourinary:     General: Normal vulva  Vagina: No bleeding or lesions  Uterus: Absent  Adnexa:         Right: No mass or tenderness  Left: No mass or tenderness  Rectum: No mass or external hemorrhoid  Normal anal tone  Comments: Atrophic changes appropriate to age  Musculoskeletal:         General: Normal range of motion  Lymphadenopathy:      Upper Body:      Right upper body: No axillary adenopathy  Left upper body: No axillary adenopathy  Neurological:      Mental Status: She is alert and oriented to person, place, and time     Psychiatric:         Mood and Affect: Mood normal          Behavior: Behavior normal

## 2023-02-11 NOTE — ASSESSMENT & PLAN NOTE
Stopped systemic HRT last year due to dx of ADH of breast   Pt states since stopping the estrace has hot flashes at night mostly (every night) and not sleeping well and notices change with skin and hair  Discussed herbal options vs SSRIs or gabapentin  Pt to try herbals  Cannot use HRT

## 2024-02-14 ENCOUNTER — ANNUAL EXAM (OUTPATIENT)
Dept: OBGYN CLINIC | Facility: CLINIC | Age: 69
End: 2024-02-14
Payer: COMMERCIAL

## 2024-02-14 VITALS
DIASTOLIC BLOOD PRESSURE: 70 MMHG | SYSTOLIC BLOOD PRESSURE: 122 MMHG | BODY MASS INDEX: 29.67 KG/M2 | HEIGHT: 64 IN | WEIGHT: 173.8 LBS

## 2024-02-14 DIAGNOSIS — N60.92 ATYPICAL DUCTAL HYPERPLASIA OF LEFT BREAST: ICD-10-CM

## 2024-02-14 DIAGNOSIS — N95.2 VAGINAL ATROPHY: ICD-10-CM

## 2024-02-14 DIAGNOSIS — Z12.31 ENCOUNTER FOR SCREENING MAMMOGRAM FOR BREAST CANCER: ICD-10-CM

## 2024-02-14 DIAGNOSIS — Z01.419 ENCOUNTER FOR ANNUAL ROUTINE GYNECOLOGICAL EXAMINATION: Primary | ICD-10-CM

## 2024-02-14 DIAGNOSIS — N95.1 VASOMOTOR SYMPTOMS DUE TO MENOPAUSE: ICD-10-CM

## 2024-02-14 DIAGNOSIS — M85.851 OSTEOPENIA OF NECK OF RIGHT FEMUR: ICD-10-CM

## 2024-02-14 PROBLEM — N39.3 STRESS INCONTINENCE (FEMALE) (MALE): Status: RESOLVED | Noted: 2021-09-10 | Resolved: 2024-02-14

## 2024-02-14 PROBLEM — Z98.890 HISTORY OF REPAIR OF RECTOCELE: Status: ACTIVE | Noted: 2021-11-01

## 2024-02-14 PROCEDURE — 99397 PER PM REEVAL EST PAT 65+ YR: CPT | Performed by: OBSTETRICS & GYNECOLOGY

## 2024-02-14 RX ORDER — ESTRADIOL 0.1 MG/G
CREAM VAGINAL
Qty: 42.5 G | Refills: 4 | Status: SHIPPED | OUTPATIENT
Start: 2024-02-14

## 2024-02-14 NOTE — PROGRESS NOTES
Gyn Wellness  [facility] - [address], [address], [place] [address]    ASSESSMENT/PLAN: Louise Hoffmann is a 68 y.o.  who presents for Medicare gynecologic wellness exam.    Encounter for routine gynecologic examination  - Routine well woman exam completed today.  - Cervical Cancer Screening complete, no further screening necessary.  - Breast Cancer Screening: Last Mammogram 2023 normal per pt  - Colorectal cancer screening last  per pt, next due .  - Osteoporosis screening 2019 osteopenia femoral neck.  - The following were reviewed in today's visit: mammography screening ordered, use and side effects of HRT, menopause, adequate intake of calcium and vitamin D, exercise, and healthy diet.        Additional problems addressed during this visit:  1. Encounter for annual routine gynecological examination    2. Encounter for screening mammogram for breast cancer  -     Mammo screening bilateral w 3d & cad; Future    3. Vaginal atrophy  Assessment & Plan:  Continues to use vaginal estrogen because was told by urogyn to continue following bladder surgery in .  She does not feel it helps with vaginal dryness or pain with intercourse.    Discussed using moisturizers and/or lubricants in addition to vaginal estrogen - gave handout with options.  Refilled vaginal estrogen.    Orders:  -     estradiol (ESTRACE) 0.1 mg/g vaginal cream; 1 gram vaginally 3x each week at night, may decrease to weekly if still effective at lower dose.    4. Osteopenia of neck of right femur  Assessment & Plan:  2019 osteopenia.  Continue supplements.  Order provided to repeat dexa.    Orders:  -     DXA bone density spine hip and pelvis; Future    5. Vasomotor symptoms due to menopause  Assessment & Plan:  Continues to have hot flashes since stopping systemic HRT after dx of ADH of breast.  She is unhappy about it and feels her quality of life is suffering.  I explained again that it is a risk factor for  breast cancer and contraindicated.      6. Atypical ductal hyperplasia of left breast  Assessment & Plan:  Follws with Dr. Tavares at [facility].  Seen q6 months for exams.  Patient reports Last mammo 2023, 2023 last u/s, last breast MRI 2022.          Next visit: 1 year Wellness      CC:  Medicare Gynecologic Wellness Examination    HPI: Louise Hoffmann is a 68 y.o.  who presents for Medicare gynecologic examination.  She denies any breast issues at today's visit.    C/o recent back pain - ruled out UTI, has MRI scheduled late this month due to h/o pancreatic cyst in past.    Still skin changes and hot flashes since stopped PO estrace due to ADH breast.  Also dryness - even with estrace vaginally.  She is frustrated about not being able to use systemic HRT    Following at [facility] breast clinic - 2023 u/s, breast MRI 2022, 2023 mammogram - seen every 6 months.        Gyn History       She  reports being sexually active and has had partner(s) who are male. She reports using the following method of birth control/protection: Post-menopausal.       Past Medical History:  2020: Atypical ductal hyperplasia of breast      Comment:  - following with Dr. Tavares, [facility] breast clinic  No date: Gastroesophageal reflux disease  No date: GERD (gastroesophageal reflux disease)  1981: History of endometriosis  No date: Lipoma of anterior chest wall  No date: Migraines  No date: PONV (postoperative nausea and vomiting)     Past Surgical History:  2016: ANKLE SURGERY; Left  2020: BREAST BIOPSY  2020: BREAST LUMPECTOMY      Comment:  atypical ductal hyperplasia  2020: BREAST SURGERY      Comment:  multiple papilomas  2019: DXA PROCEDURE (HISTORICAL)  2020: PANCREATIC CYST BIOPSY  2021: OR CYSTOURETHROSCOPY; N/A      Comment:  Procedure: CYSTOSCOPY;  Surgeon: Vince Ulrich MD;                 [facility];  Service: UroGynecology         2021: OR  REPAIR RECTOCELE SEPARATE PROCEDURE; N/A      Comment:  Procedure: RECTOCELE REPAIR;  Surgeon: Vince Ulrich MD;  [facility];  Service: UroGynecology         11/01/2021: [place] SLING OPERATION STRESS INCONTINENCE; N/A      Comment:  Procedure: PUBOVAGINAL SLING;  Surgeon: Vince Ulrich MD;  [facility];  Service: UroGynecology         1989: TOTAL ABDOMINAL HYSTERECTOMY W/ BILATERAL SALPINGOOPHORECTOMY      Comment:  for endometriosis     Family History   Problem Relation Age of Onset    Hypertension Mother     Cancer Father     Colon cancer Father     Hypertension Father     Pancreatic cancer Brother 77        Social History     Tobacco Use    Smoking status: Never    Smokeless tobacco: Never   Vaping Use    Vaping status: Never Used   Substance Use Topics    Alcohol use: Never    Drug use: Never          Current Outpatient Medications:     aspirin 325 mg tablet, Take 325 mg by mouth daily, Disp: , Rfl:     CALCIUM CITRATE PO, Take 630 mg by mouth daily, Disp: , Rfl:     cholecalciferol (VITAMIN D3) 400 units tablet, Take 400 Units by mouth daily, Disp: , Rfl:     dexamethasone (DECADRON) 4 mg tablet, Take 4 mg by mouth daily as needed (headaches), Disp: , Rfl:     esomeprazole (NexIUM) 40 MG capsule, Take 40 mg by mouth every morning before breakfast, Disp: , Rfl:     estradiol (ESTRACE) 0.1 mg/g vaginal cream, 1 gram vaginally 3x each week at night, may decrease to weekly if still effective at lower dose., Disp: 42.5 g, Rfl: 4    Ferrous Gluconate (IRON 27 PO), Take by mouth 3x/week, Disp: , Rfl:     magnesium oxide (MAG-OX) 400 mg, Take 400 mg by mouth daily, Disp: , Rfl:     multivitamin (THERAGRAN) TABS, Take 1 tablet by mouth daily, Disp: , Rfl:     naratriptan (AMERGE) 2.5 MG tablet, Take 2.5 mg by mouth daily as needed, Disp: , Rfl:     NON FORMULARY, 1 caplet daily Fever few, Disp: , Rfl:     Red Yeast Rice 600 MG CAPS, Take 1 capsule by mouth daily, Disp: ,  "Rfl:     Soolantra 1 % CREA, APPLY TOPICALLY ONCE DAILY, Disp: , Rfl:     verapamil (CALAN) 120 mg tablet, Take 120 mg by mouth daily at bedtime, Disp: , Rfl:     verapamil (CALAN-SR) 240 mg CR tablet, Take 240 mg by mouth daily In morning, Disp: , Rfl:     She is allergic to compazine [prochlorperazine] and other..    ROS negative except as noted in HPI    Objective:  /70   Ht 5' 4.25\" (1.632 m)   Wt 78.8 kg (173 lb 12.8 oz)   Breastfeeding No   BMI 29.60 kg/m²      Physical Exam  Constitutional:       Appearance: Normal appearance.   Chest:   Breasts:     Right: No mass or tenderness.      Left: No mass or tenderness.   Abdominal:      Palpations: Abdomen is soft.      Tenderness: There is no abdominal tenderness.   Genitourinary:     General: Normal vulva.      Vagina: No bleeding or lesions.      Uterus: Absent.       Adnexa:         Right: No mass or tenderness.          Left: No mass or tenderness.        Rectum: No mass or external hemorrhoid. Normal anal tone.      Comments: Atrophic changes appropriate to age.  Musculoskeletal:         General: Normal range of motion.   Lymphadenopathy:      Upper Body:      Right upper body: No axillary adenopathy.      Left upper body: No axillary adenopathy.   Neurological:      Mental Status: She is alert and oriented to person, place, and time.   Psychiatric:         Mood and Affect: Mood normal.         Behavior: Behavior normal.         "

## 2024-02-14 NOTE — PATIENT INSTRUCTIONS
- Maintain healthy weight with BMI ideally between 18-25.    - Eat a healthy diet, including multiple servings of vegetables and fruits, as well as lean protein sources.  - Get at least 150 minutes of moderate aerobic activity or 75 minutes of vigorous aerobic activity a week, or a combination of moderate and vigorous activity.  Greater amounts of exercise will provide an even greater health benefit.   - Ensure diet provides 1200mg of Calcium daily (divided) and 800IU of vitamin D, or take supplements to meet this.  - Safe sex practices recommended.    - Resources - information on birth control and sexually transmitted infections - www.bedsider.org            - information on sexually transmitted infections - www.cdc.gov/std/

## 2024-02-17 NOTE — ASSESSMENT & PLAN NOTE
Sean with Dr. Tavares at [facility].  Seen q6 months for exams.  Patient reports Last mammo 7/2023, 12/2023 last u/s, last breast MRI 12/2022.

## 2024-02-17 NOTE — ASSESSMENT & PLAN NOTE
Continues to have hot flashes since stopping systemic HRT after dx of ADH of breast.  She is unhappy about it and feels her quality of life is suffering.  I explained again that it is a risk factor for breast cancer and contraindicated.

## 2024-02-17 NOTE — ASSESSMENT & PLAN NOTE
Continues to use vaginal estrogen because was told by urogyn to continue following bladder surgery in 2021.  She does not feel it helps with vaginal dryness or pain with intercourse.    Discussed using moisturizers and/or lubricants in addition to vaginal estrogen - gave handout with options.  Refilled vaginal estrogen.

## 2024-07-25 DIAGNOSIS — M85.851 OSTEOPENIA OF NECK OF RIGHT FEMUR: ICD-10-CM

## 2024-10-22 ENCOUNTER — TELEPHONE (OUTPATIENT)
Age: 69
End: 2024-10-22

## 2024-10-22 NOTE — TELEPHONE ENCOUNTER
Pt wants to discuss resuming estrogen medication with Dr. Aguilar. She would like a call from provider.

## 2024-10-22 NOTE — TELEPHONE ENCOUNTER
Returned patient call in Dr. Aguilar's absence. Patient states that her hot flash symptoms are unbearable off systemic HRT. Medication was discontinued due to her history of atypical ductal hyperplasia of the breast. She is requesting to restart. We did briefly discuss availability of non-hormonal pharmacologic treatment options for hot flashes, but patient states that she does not like the potential side effects of the non-hormonal medications she has researched. I recommended that patient schedule an appointment to discuss further with Dr. Aguilar, as she knows her best and will be back to the office soon. Patient agreeable to plan.     Rojelio Muñoz MD  10/22/2024 5:16 PM

## 2024-11-06 ENCOUNTER — OFFICE VISIT (OUTPATIENT)
Dept: OBGYN CLINIC | Facility: CLINIC | Age: 69
End: 2024-11-06
Payer: COMMERCIAL

## 2024-11-06 VITALS
BODY MASS INDEX: 29.88 KG/M2 | DIASTOLIC BLOOD PRESSURE: 94 MMHG | SYSTOLIC BLOOD PRESSURE: 132 MMHG | WEIGHT: 175 LBS | HEIGHT: 64 IN

## 2024-11-06 DIAGNOSIS — N60.92 ATYPICAL DUCTAL HYPERPLASIA OF LEFT BREAST: ICD-10-CM

## 2024-11-06 DIAGNOSIS — N95.1 VASOMOTOR SYMPTOMS DUE TO MENOPAUSE: Primary | ICD-10-CM

## 2024-11-06 PROCEDURE — 99214 OFFICE O/P EST MOD 30 MIN: CPT | Performed by: OBSTETRICS & GYNECOLOGY

## 2024-11-06 NOTE — LETTER
November 6, 2024     Maureen Rich, DO  [address] [facility] [address]    Patient: Louise Hoffmann   YOB: 1955   Date of Visit: 11/6/2024       Dear Dr. Millicent Rich:    Thank you for referring Louise Hoffmann to me for evaluation. Below are my notes for this consultation.    If you have questions, please do not hesitate to call me. I look forward to following your patient along with you.         Sincerely,        Jammie Aguilar MD        CC: No Recipients    Jammie Aguilar MD  11/6/2024  1:06 PM  Sign when Signing Visit  [facility] - [address], [address], [place] [address]    Assessment & Plan  Vasomotor symptoms due to menopause  Louise is having hot flashes and other symptoms (sleep disturbance, skin and hair changes, low libido) since she stopped estrogen treatment in 2022 after her breast biopsy showing atypical ductal hyperplasia.  She states she understands there is an increase risk of breast cancer on Estrogen treatment but feels it is low and the benefits of estrogen to her quality of life are worth the risk.  Previously we had discussed non-hormonal options for treating hot flashes but she has declined.  Today I tried to have a review of non-hormonal treatments like SSRIs, Gabapentin and Veozah for treating her hot flashes.  She quickly dismissed these as having other side effects she was not willing to risk.  I reviewed there are some studies that progesterone treatments like Depo Provera or oral norethindrone acetate may be effective in decreasing hot flashes - we could review this as an option with Dr. Tavares, breast surgeon at [facility].  Louise states she does not want progesterone treatment, she wants estrogen.    I discussed that there is evidence that addition estrogen in patients with a history of ADH increases future risk of breast cancer.  This is why patients with a diagnosis of ADH are offered estrogen blocking therapy following diagnosis to  "decrease their risk and are followed more closely for breast cancer following diagnosis.  For this reason I do not feel that estrogen treatment is appropriate in her case.  I also reviewed her history with some of my partners who agree and I reviewed this with her.  I encouraged her to consider non-hormonal treatments, which she again refuses.  She is frustrated by the effect of her symptoms on her life and my refusal to prescribe, she points out that \"other providers\" feel it is okay.    I expressed my empathy with her symptoms but do not feel I can in good conscious prescribe this medication.  I encouraged her to see another provider and if they are willing to provide estrogen that is up to them.  I offered a referral to Dr. Lizzette Elizondo, at [facility] who deals with more complex menopausal cases for another opinion.  She agrees to accept referral.  She also states she plans to see a gynecologist at [place] to discuss, I encouraged her to do so.  She asked to have her records transferred to [facility] - I will have  assist her with that process.      Orders:  •  Ambulatory Referral to Obstetrics / Gynecology; Future    Atypical ductal hyperplasia of left breast  7/2020 breast bx showed atypical ducal hyperplasia, neg margins.  Patient has continued to follow with Dr. Tavares at [facility] for follow up.  Patient was offered but declined estrogen blocking treatment following diagnosis.  She continues to follow with mammogram and ultrasound.  Last imaging 7/2024 bi-rads 2.  Per 10/2024 note (scanned to chart) Breast MRI is recommended and scheduled for 2024.  Orders:  •  Ambulatory Referral to Obstetrics / Gynecology; Future      I have spent a total time of 30 minutes in caring for this patient on the day of the visit/encounter including Prognosis, Risks and benefits of tx options, Instructions for management, Patient and family education, Impressions, Counseling / " "Coordination of care, Documenting in the medical record, and Obtaining or reviewing history  .      Subjective:   Louise Hoffmann is a 69 y.o.  female.    HPI:   Louise presents to discuss menopausal symptoms and hormone therapy.      Historically Louise was placed on PO Estrace in  after her DARLINE, BSO for endometriosis; she reports symptoms were significant for hot flashes.  In 2020 she had a biopsy of her breast with diagnosis of atypical ductal hyperplasia, done at [facility] with Dr. Cleopatra Tavares.  I discussed this diagnosis with Dr. Tavares personally who agreed that estrogen therapy was contraindicated in a patient with this history, but vaginal estrogen was okay.  [facility] in [facility] I reviewed this with Louise and told her I would no longer prescribe oral Estrogen hormone replacement.  I did agree to prescribe vaginal estrogen.  Over the last two years at her [facility] visit she has c/o significant return of hot flashes, sleep issues and skin and nail changes.  We have discussed non-hormonal alternatives but she has declined.    Today Louise presents to ask for estrogen treatment.  She feels her hot flashes are severe, daily and interfering with her quality of life.  She is not sleeping and states her \"sex life is terrible\".  She also c/o skin and hair changes.  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  She states she talked to the CRNP at Dr. Tavares's office who suggested \"talk to your gyn.\"  She also states she has talked to her prior gynecologist, who is in [place], and he recommends restarting estrogen for her symptoms.        Gyn History  No LMP recorded. Patient has had a hysterectomy.       Last pap smear: Not on file    She  reports being sexually active and has had partner(s) who are male. " She reports using the following method of birth control/protection: Post-menopausal.       OB History      Past Medical History:  2020: Atypical ductal hyperplasia of breast      Comment:  - following with Dr. Tavares, [facility] high risk breast clinic  No date: Gastroesophageal reflux disease  No date: GERD (gastroesophageal reflux disease)  1981: History of endometriosis  No date: Lipoma of anterior chest wall  No date: Migraines  No date: PONV (postoperative nausea and vomiting)     Past Surgical History:  2016: ANKLE SURGERY; Left  2020: BREAST BIOPSY  2020: BREAST LUMPECTOMY      Comment:  atypical ductal hyperplasia  2020: BREAST SURGERY      Comment:  multiple papilomas  2019: DXA PROCEDURE (HISTORICAL)  2020: PANCREATIC CYST BIOPSY  2021: NM CYSTOURETHROSCOPY; N/A      Comment:  Procedure: CYSTOSCOPY;  Surgeon: Vince Ulrich MD;                 [facility];  Service: UroGynecology         2021: NM REPAIR RECTOCELE SEPARATE PROCEDURE; N/A      Comment:  Procedure: RECTOCELE REPAIR;  Surgeon: Vince Ulrich MD;  [facility];  Service: UroGynecology         2021: NM SLING OPERATION STRESS INCONTINENCE; N/A      Comment:  Procedure: PUBOVAGINAL SLING;  Surgeon: Vince Ulrich MD;  [facility];  Service: UroGynecology         : TOTAL ABDOMINAL HYSTERECTOMY W/ BILATERAL SALPINGOOPHORECTOMY      Comment:  for endometriosis     Social History     Tobacco Use   • Smoking status: Never   • Smokeless tobacco: Never   Vaping Use   • Vaping status: Never Used   Substance Use Topics   • Alcohol use: Never   • Drug use: Never          Current Outpatient Medications:   •  aspirin 325 mg tablet, Take 325 mg by mouth daily, Disp: , Rfl:   •  CALCIUM CITRATE PO, Take 630 mg by mouth daily, Disp: , Rfl:   •  cholecalciferol (VITAMIN D3) 400 units tablet, Take 400 Units by mouth daily, Disp: , Rfl:   •  dexamethasone (DECADRON) 4 mg  "tablet, Take 4 mg by mouth daily as needed (headaches), Disp: , Rfl:   •  esomeprazole (NexIUM) 40 MG capsule, Take 40 mg by mouth every morning before breakfast, Disp: , Rfl:   •  estradiol (ESTRACE) 0.1 mg/g vaginal cream, 1 gram vaginally 3x each week at night, may decrease to weekly if still effective at lower dose., Disp: 42.5 g, Rfl: 4  •  Ferrous Gluconate (IRON 27 PO), Take by mouth 3x/week, Disp: , Rfl:   •  magnesium oxide (MAG-OX) 400 mg, Take 400 mg by mouth daily, Disp: , Rfl:   •  multivitamin (THERAGRAN) TABS, Take 1 tablet by mouth daily, Disp: , Rfl:   •  naratriptan (AMERGE) 2.5 MG tablet, Take 2.5 mg by mouth daily as needed, Disp: , Rfl:   •  NON FORMULARY, 1 caplet daily Fever few, Disp: , Rfl:   •  Red Yeast Rice 600 MG CAPS, Take 1 capsule by mouth daily, Disp: , Rfl:   •  Soolantra 1 % CREA, APPLY TOPICALLY ONCE DAILY, Disp: , Rfl:   •  verapamil (CALAN) 120 mg tablet, Take 120 mg by mouth daily at bedtime, Disp: , Rfl:   •  verapamil (CALAN-SR) 240 mg CR tablet, Take 240 mg by mouth daily In morning, Disp: , Rfl:     She is allergic to compazine [prochlorperazine] and other..    ROS: Review of Systems   Constitutional: Negative.    Gastrointestinal: Negative.    Endocrine: Positive for heat intolerance (hot flashes).   Genitourinary: Negative.         Low libido   Psychiatric/Behavioral:  Positive for sleep disturbance.        Objective:  /94 (BP Location: Left arm, Patient Position: Sitting, Cuff Size: Standard)   Ht 5' 4.25\" (1.632 m)   Wt 79.4 kg (175 lb)   BMI 29.81 kg/m²      Physical Exam  Constitutional:       Appearance: Normal appearance.   Neurological:      Mental Status: She is alert and oriented to person, place, and time.   Psychiatric:         Behavior: Behavior normal.         "

## 2024-11-06 NOTE — ASSESSMENT & PLAN NOTE
7/2020 breast bx showed atypical ducal hyperplasia, neg margins.  Patient has continued to follow with Dr. Tavares at [facility] for follow up.  Patient was offered but declined estrogen blocking treatment following diagnosis.  She continues to follow with mammogram and ultrasound.  Last imaging 7/2024 bi-rads 2.  Per 10/2024 note (scanned to chart) Breast MRI is recommended and scheduled for 2024.  Orders:    Ambulatory Referral to Obstetrics / Gynecology; Future

## 2024-11-06 NOTE — PROGRESS NOTES
"[facility] [address], [address], [place] [address]    Assessment & Plan  Vasomotor symptoms due to menopause  Louise is having hot flashes and other symptoms (sleep disturbance, skin and hair changes, low libido) since she stopped estrogen treatment in 2022 after her breast biopsy showing atypical ductal hyperplasia.  She states she understands there is an increase risk of breast cancer on Estrogen treatment but feels it is low and the benefits of estrogen to her quality of life are worth the risk.  Previously we had discussed non-hormonal options for treating hot flashes but she has declined.  Today I tried to have a review of non-hormonal treatments like SSRIs, Gabapentin and Veozah for treating her hot flashes.  She quickly dismissed these as having other side effects she was not willing to risk.  I reviewed there are some studies that progesterone treatments like Depo Provera or oral norethindrone acetate may be effective in decreasing hot flashes - we could review this as an option with Dr. Tavares, breast surgeon at [facility].  Louise states she does not want progesterone treatment, she wants estrogen.    I discussed that there is evidence that addition estrogen in patients with a history of ADH increases future risk of breast cancer.  This is why patients with a diagnosis of ADH are offered estrogen blocking therapy following diagnosis to decrease their risk and are followed more closely for breast cancer following diagnosis.  For this reason I do not feel that estrogen treatment is appropriate in her case.  I also reviewed her history with some of my partners who agree and I reviewed this with her.  I encouraged her to consider non-hormonal treatments, which she again refuses.  She is frustrated by the effect of her symptoms on her life and my refusal to prescribe, she points out that \"other providers\" feel it is okay.    I expressed my empathy with her symptoms but do not feel I can in good " conscious prescribe this medication.  I encouraged her to see another provider and if they are willing to provide estrogen that is up to them.  I offered a referral to Dr. Lizzette Elizondo, at [facility] who deals with more complex menopausal cases for another opinion.  She agrees to accept referral.  She also states she plans to see a gynecologist at [place] to discuss, I encouraged her to do so.  She asked to have her records transferred to [facility] - I will have  assist her with that process.      Orders:    Ambulatory Referral to Obstetrics / Gynecology; Future    Atypical ductal hyperplasia of left breast  2020 breast bx showed atypical ducal hyperplasia, neg margins.  Patient has continued to follow with Dr. Tavares at [facility] for follow up.  Patient was offered but declined estrogen blocking treatment following diagnosis.  She continues to follow with mammogram and ultrasound.  Last imaging 2024 bi-rads 2.  Per 10/2024 note (scanned to chart) Breast MRI is recommended and scheduled for .  Orders:    Ambulatory Referral to Obstetrics / Gynecology; Future      I have spent a total time of 30 minutes in caring for this patient on the day of the visit/encounter including Prognosis, Risks and benefits of tx options, Instructions for management, Patient and family education, Impressions, Counseling / Coordination of care, Documenting in the medical record, and Obtaining or reviewing history  .      Subjective:   Louise Hoffmann is a 69 y.o.  female.    HPI:   Louise presents to discuss menopausal symptoms and hormone therapy.      Historically Louise was placed on PO Estrace in  after her DARLINE, BSO for endometriosis; she reports symptoms were significant for hot flashes.  In 2020 she had a biopsy of her breast with diagnosis of atypical ductal hyperplasia, done at [facility] with Dr. Cleopatra Tavares.  I discussed this diagnosis with Dr. Tavares  "personally who agreed that estrogen therapy was contraindicated in a patient with this history, but vaginal estrogen was okay.  [facility] in [facility] I reviewed this with Louise and told her I would no longer prescribe oral Estrogen hormone replacement.  I did agree to prescribe vaginal estrogen.  Over the last two years at her [facility] visit she has c/o significant return of hot flashes, sleep issues and skin and nail changes.  We have discussed non-hormonal alternatives but she has declined.    Today Louise presents to ask for estrogen treatment.  She feels her hot flashes are severe, daily and interfering with her quality of life.  She is not sleeping and states her \"sex life is terrible\".  She also c/o skin and hair changes.  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  She states she talked to the CRNP at Dr. Tavares's office who suggested \"talk to your gyn.\"  She also states she has talked to her prior gynecologist, who is in [place], and he recommends restarting estrogen for her symptoms.        Gyn History  No LMP recorded. Patient has had a hysterectomy.       Last pap smear: Not on file    She  reports being sexually active and has had partner(s) who are male. She reports using the following method of birth control/protection: Post-menopausal.       OB History      Past Medical History:  2020: Atypical ductal hyperplasia of breast      Comment:  - following with Dr. Tavares, [facility] high risk breast clinic  No date: Gastroesophageal reflux disease  No date: GERD (gastroesophageal reflux disease)  1981: History of endometriosis  No date: Lipoma of anterior chest wall  No date: Migraines  No date: PONV (postoperative nausea and vomiting)     Past Surgical History:  2016: ANKLE SURGERY; Left  2020: BREAST " BIOPSY  07/03/2020: BREAST LUMPECTOMY      Comment:  atypical ductal hyperplasia  01/2020: BREAST SURGERY      Comment:  multiple papilomas  2019: DXA PROCEDURE (HISTORICAL)  2020: PANCREATIC CYST BIOPSY  11/01/2021: IN CYSTOURETHROSCOPY; N/A      Comment:  Procedure: CYSTOSCOPY;  Surgeon: Vince Ulrich MD;                 [facility];  Service: UroGynecology         11/01/2021: IN REPAIR RECTOCELE SEPARATE PROCEDURE; N/A      Comment:  Procedure: RECTOCELE REPAIR;  Surgeon: Vince Ulrich MD;  [facility];  Service: UroGynecology         11/01/2021: IN SLING OPERATION STRESS INCONTINENCE; N/A      Comment:  Procedure: PUBOVAGINAL SLING;  Surgeon: Vince Ulrich MD;  [facility];  Service: UroGynecology         1989: TOTAL ABDOMINAL HYSTERECTOMY W/ BILATERAL SALPINGOOPHORECTOMY      Comment:  for endometriosis     Social History     Tobacco Use    Smoking status: Never    Smokeless tobacco: Never   Vaping Use    Vaping status: Never Used   Substance Use Topics    Alcohol use: Never    Drug use: Never          Current Outpatient Medications:     aspirin 325 mg tablet, Take 325 mg by mouth daily, Disp: , Rfl:     CALCIUM CITRATE PO, Take 630 mg by mouth daily, Disp: , Rfl:     cholecalciferol (VITAMIN D3) 400 units tablet, Take 400 Units by mouth daily, Disp: , Rfl:     dexamethasone (DECADRON) 4 mg tablet, Take 4 mg by mouth daily as needed (headaches), Disp: , Rfl:     esomeprazole (NexIUM) 40 MG capsule, Take 40 mg by mouth every morning before breakfast, Disp: , Rfl:     estradiol (ESTRACE) 0.1 mg/g vaginal cream, 1 gram vaginally 3x each week at night, may decrease to weekly if still effective at lower dose., Disp: 42.5 g, Rfl: 4    Ferrous Gluconate (IRON 27 PO), Take by mouth 3x/week, Disp: , Rfl:     magnesium oxide (MAG-OX) 400 mg, Take 400 mg by mouth daily, Disp: , Rfl:     multivitamin (THERAGRAN) TABS, Take 1 tablet by mouth daily, Disp: ,  "Rfl:     naratriptan (AMERGE) 2.5 MG tablet, Take 2.5 mg by mouth daily as needed, Disp: , Rfl:     NON FORMULARY, 1 caplet daily Fever few, Disp: , Rfl:     Red Yeast Rice 600 MG CAPS, Take 1 capsule by mouth daily, Disp: , Rfl:     Soolantra 1 % CREA, APPLY TOPICALLY ONCE DAILY, Disp: , Rfl:     verapamil (CALAN) 120 mg tablet, Take 120 mg by mouth daily at bedtime, Disp: , Rfl:     verapamil (CALAN-SR) 240 mg CR tablet, Take 240 mg by mouth daily In morning, Disp: , Rfl:     She is allergic to compazine [prochlorperazine] and other..    ROS: Review of Systems   Constitutional: Negative.    Gastrointestinal: Negative.    Endocrine: Positive for heat intolerance (hot flashes).   Genitourinary: Negative.         Low libido   Psychiatric/Behavioral:  Positive for sleep disturbance.        Objective:  /94 (BP Location: Left arm, Patient Position: Sitting, Cuff Size: Standard)   Ht 5' 4.25\" (1.632 m)   Wt 79.4 kg (175 lb)   BMI 29.81 kg/m²      Physical Exam  Constitutional:       Appearance: Normal appearance.   Neurological:      Mental Status: She is alert and oriented to person, place, and time.   Psychiatric:         Behavior: Behavior normal.         "

## 2024-11-06 NOTE — ASSESSMENT & PLAN NOTE
"Louise is having hot flashes and other symptoms (sleep disturbance, skin and hair changes, low libido) since she stopped estrogen treatment in 2022 after her breast biopsy showing atypical ductal hyperplasia.  She states she understands there is an increase risk of breast cancer on Estrogen treatment but feels it is low and the benefits of estrogen to her quality of life are worth the risk.  Previously we had discussed non-hormonal options for treating hot flashes but she has declined.  Today I tried to have a review of non-hormonal treatments like SSRIs, Gabapentin and Veozah for treating her hot flashes.  She quickly dismissed these as having other side effects she was not willing to risk.  I reviewed there are some studies that progesterone treatments like Depo Provera or oral norethindrone acetate may be effective in decreasing hot flashes - we could review this as an option with Dr. Tavares, breast surgeon at [facility].  Louise states she does not want progesterone treatment, she wants estrogen.    I discussed that there is evidence that addition estrogen in patients with a history of ADH increases future risk of breast cancer.  This is why patients with a diagnosis of ADH are offered estrogen blocking therapy following diagnosis to decrease their risk and are followed more closely for breast cancer following diagnosis.  For this reason I do not feel that estrogen treatment is appropriate in her case.  I also reviewed her history with some of my partners who agree and I reviewed this with her.  I encouraged her to consider non-hormonal treatments, which she again refuses.  She is frustrated by the effect of her symptoms on her life and my refusal to prescribe, she points out that \"other providers\" feel it is okay.    I expressed my empathy with her symptoms but do not feel I can in good conscious prescribe this medication.  I encouraged her to see another provider and if they are willing to provide estrogen that is up to " them.  I offered a referral to Dr. Lizzette Elizondo, at [facility] who deals with more complex menopausal cases for another opinion.  She agrees to accept referral.  She also states she plans to see a gynecologist at [place] to discuss, I encouraged her to do so.  She asked to have her records transferred to [facility] - I will have  assist her with that process.      Orders:    Ambulatory Referral to Obstetrics / Gynecology; Future

## 2025-05-17 DIAGNOSIS — N95.2 VAGINAL ATROPHY: ICD-10-CM

## 2025-05-19 RX ORDER — ESTRADIOL 0.1 MG/G
CREAM VAGINAL
Qty: 42.5 G | Refills: 4 | Status: SHIPPED | OUTPATIENT
Start: 2025-05-19

## 2025-07-29 ENCOUNTER — HOSPITAL ENCOUNTER (OUTPATIENT)
Age: 70
Discharge: HOME | End: 2025-07-29
Attending: NURSE PRACTITIONER
Payer: COMMERCIAL

## 2025-07-29 VITALS — HEIGHT: 66 IN | BODY MASS INDEX: 27.32 KG/M2 | WEIGHT: 170 LBS

## 2025-07-29 DIAGNOSIS — Z12.31 ENCOUNTER FOR SCREENING MAMMOGRAM FOR BREAST CANCER: ICD-10-CM

## 2025-07-29 PROCEDURE — 77063 BREAST TOMOSYNTHESIS BI: CPT

## 2025-07-29 PROCEDURE — 77067 SCR MAMMO BI INCL CAD: CPT

## 2025-07-30 ENCOUNTER — TELEPHONE (OUTPATIENT)
Age: 70
End: 2025-07-30

## (undated) DEVICE — TUBING SUCTION 5MM X 12 FT

## (undated) DEVICE — NEEDLE HYPO 22G X 1-1/2 IN

## (undated) DEVICE — INTENDED FOR TISSUE SEPARATION, AND OTHER PROCEDURES THAT REQUIRE A SHARP SURGICAL BLADE TO PUNCTURE OR CUT.: Brand: BARD-PARKER SAFETY BLADES SIZE 11, STERILE

## (undated) DEVICE — VIAL DECANTER

## (undated) DEVICE — EXIDINE 4 PCT

## (undated) DEVICE — PREMIUM DRY TRAY LF: Brand: MEDLINE INDUSTRIES, INC.

## (undated) DEVICE — GLOVE PI ULTRA TOUCH SZ.8.0

## (undated) DEVICE — SCD SEQUENTIAL COMPRESSION COMFORT SLEEVE MEDIUM KNEE LENGTH: Brand: KENDALL SCD

## (undated) DEVICE — MEDI-VAC YANKAUER SUCTION HANDLE W/BULBOUS AND CONTROL VENT: Brand: CARDINAL HEALTH

## (undated) DEVICE — ELECTROSURGICAL DEVICE HOLSTER;FOR USE WITH MAXIMUM PEAK VOLTAGE OF 4000 V: Brand: FORCE TRIVERSE

## (undated) DEVICE — 2000CC GUARDIAN II: Brand: GUARDIAN

## (undated) DEVICE — CATH FOLEY 18FR 5ML 2 WAY UNCOATED SILICONE

## (undated) DEVICE — BULB SYRINGE,IRRIGATION WITH PROTECTIVE CAP: Brand: DOVER

## (undated) DEVICE — ALLENTOWN DR  LUCENTE S LAP PK: Brand: CARDINAL HEALTH

## (undated) DEVICE — IV FLUSH NSS 10ML POSIFLUSH

## (undated) DEVICE — UNDER BUTTOCKS DRAPE W/FLUID CONTROL POUCH: Brand: CONVERTORS

## (undated) DEVICE — SMOKE EVACUATION TUBING WITH 8 IN INTEGRAL WAND AND SPONGE GUARD: Brand: BUFFALO FILTER

## (undated) DEVICE — CAUTERY TIP POLISHER: Brand: DEVON

## (undated) DEVICE — SUT VICRYL 2-0 SH 27 IN UNDYED J417H